# Patient Record
Sex: MALE | Race: WHITE | Employment: UNEMPLOYED | URBAN - METROPOLITAN AREA
[De-identification: names, ages, dates, MRNs, and addresses within clinical notes are randomized per-mention and may not be internally consistent; named-entity substitution may affect disease eponyms.]

---

## 2017-01-03 ENCOUNTER — DOCUMENTATION ONLY (OUTPATIENT)
Dept: ANESTHESIOLOGY | Facility: CLINIC | Age: 32
End: 2017-01-03

## 2017-01-03 DIAGNOSIS — M54.6 PAIN IN THORACIC SPINE: Primary | ICD-10-CM

## 2017-01-03 RX ORDER — FENTANYL 25 UG/1
1 PATCH TRANSDERMAL
Qty: 10 PATCH | Refills: 0 | Status: SHIPPED | OUTPATIENT
Start: 2017-01-11 | End: 2017-04-17

## 2017-01-03 NOTE — TELEPHONE ENCOUNTER
Today I left a message on Manoj Carias cell phone that we will wean him off the pain medications.  As per our discussion last office visit he found a new Pain center close to his house and the pain physician would like to do thoracic facets syndrome and thoracic MBB's  In order for them to do this patient needs to be off narcotics so as we previously discussed I'm going to wean him off his Oipiods Fentanyl patch decreased form 50 mcg/hour to 25 mcg/hour from January 11, 2017-to 12.5 mcg/hour up to February 10 th, 2017 and to off in March. As well Dilaudid 4 mg PO Q 12 hours prn pain max to 2 tabs per day for January to 4 mg PO Q 12 hours prn pain (max of 1.5 mg Tabs/day=6 mg per day) to 4 mg Po 1/2 tab Q 12 hours prn pain (2 mg PO Q 12 hours  Prn pain) in February to Off in March

## 2017-02-14 DIAGNOSIS — C81.90 HODGKIN LYMPHOMA (H): Primary | ICD-10-CM

## 2017-04-17 ENCOUNTER — OFFICE VISIT (OUTPATIENT)
Dept: TRANSPLANT | Facility: CLINIC | Age: 32
End: 2017-04-17
Attending: NURSE PRACTITIONER
Payer: MEDICARE

## 2017-04-17 VITALS
DIASTOLIC BLOOD PRESSURE: 62 MMHG | BODY MASS INDEX: 31.25 KG/M2 | OXYGEN SATURATION: 97 % | TEMPERATURE: 98.5 F | RESPIRATION RATE: 16 BRPM | WEIGHT: 250 LBS | HEART RATE: 86 BPM | SYSTOLIC BLOOD PRESSURE: 112 MMHG

## 2017-04-17 DIAGNOSIS — C81.00 NODULAR LYMPHOCYTE PREDOMINANT HODGKIN LYMPHOMA, UNSPECIFIED BODY REGION (H): Primary | ICD-10-CM

## 2017-04-17 LAB
ALBUMIN SERPL-MCNC: 3.6 G/DL (ref 3.4–5)
ALP SERPL-CCNC: 137 U/L (ref 40–150)
ALT SERPL W P-5'-P-CCNC: 33 U/L (ref 0–70)
ANION GAP SERPL CALCULATED.3IONS-SCNC: 7 MMOL/L (ref 3–14)
AST SERPL W P-5'-P-CCNC: 25 U/L (ref 0–45)
BASOPHILS # BLD AUTO: 0.1 10E9/L (ref 0–0.2)
BASOPHILS NFR BLD AUTO: 0.5 %
BILIRUB SERPL-MCNC: 0.7 MG/DL (ref 0.2–1.3)
BUN SERPL-MCNC: 14 MG/DL (ref 7–30)
CALCIUM SERPL-MCNC: 8.8 MG/DL (ref 8.5–10.1)
CHLORIDE SERPL-SCNC: 104 MMOL/L (ref 94–109)
CO2 SERPL-SCNC: 28 MMOL/L (ref 20–32)
CREAT SERPL-MCNC: 1.03 MG/DL (ref 0.66–1.25)
DIFFERENTIAL METHOD BLD: ABNORMAL
EOSINOPHIL # BLD AUTO: 0.3 10E9/L (ref 0–0.7)
EOSINOPHIL NFR BLD AUTO: 1.7 %
ERYTHROCYTE [DISTWIDTH] IN BLOOD BY AUTOMATED COUNT: 12.7 % (ref 10–15)
GFR SERPL CREATININE-BSD FRML MDRD: 83 ML/MIN/1.7M2
GLUCOSE SERPL-MCNC: 108 MG/DL (ref 70–99)
HCT VFR BLD AUTO: 48.3 % (ref 40–53)
HGB BLD-MCNC: 16.5 G/DL (ref 13.3–17.7)
IMM GRANULOCYTES # BLD: 0.1 10E9/L (ref 0–0.4)
IMM GRANULOCYTES NFR BLD: 0.5 %
LDH SERPL L TO P-CCNC: 220 U/L (ref 85–227)
LYMPHOCYTES # BLD AUTO: 3.2 10E9/L (ref 0.8–5.3)
LYMPHOCYTES NFR BLD AUTO: 21 %
MCH RBC QN AUTO: 31.4 PG (ref 26.5–33)
MCHC RBC AUTO-ENTMCNC: 34.2 G/DL (ref 31.5–36.5)
MCV RBC AUTO: 92 FL (ref 78–100)
MONOCYTES # BLD AUTO: 0.8 10E9/L (ref 0–1.3)
MONOCYTES NFR BLD AUTO: 5.4 %
NEUTROPHILS # BLD AUTO: 10.7 10E9/L (ref 1.6–8.3)
NEUTROPHILS NFR BLD AUTO: 70.9 %
NRBC # BLD AUTO: 0 10*3/UL
NRBC BLD AUTO-RTO: 0 /100
PLATELET # BLD AUTO: 137 10E9/L (ref 150–450)
POTASSIUM SERPL-SCNC: 4.2 MMOL/L (ref 3.4–5.3)
PROT SERPL-MCNC: 6.9 G/DL (ref 6.8–8.8)
RBC # BLD AUTO: 5.26 10E12/L (ref 4.4–5.9)
SODIUM SERPL-SCNC: 139 MMOL/L (ref 133–144)
WBC # BLD AUTO: 15 10E9/L (ref 4–11)

## 2017-04-17 PROCEDURE — 86357 NK CELLS TOTAL COUNT: CPT | Performed by: INTERNAL MEDICINE

## 2017-04-17 PROCEDURE — 86355 B CELLS TOTAL COUNT: CPT | Performed by: INTERNAL MEDICINE

## 2017-04-17 PROCEDURE — 88311 DECALCIFY TISSUE: CPT | Performed by: INTERNAL MEDICINE

## 2017-04-17 PROCEDURE — 38221 DX BONE MARROW BIOPSIES: CPT | Mod: 50,ZF

## 2017-04-17 PROCEDURE — 86359 T CELLS TOTAL COUNT: CPT | Performed by: INTERNAL MEDICINE

## 2017-04-17 PROCEDURE — 88305 TISSUE EXAM BY PATHOLOGIST: CPT | Performed by: INTERNAL MEDICINE

## 2017-04-17 PROCEDURE — G0364 BONE MARROW ASPIRATE &BIOPSY: HCPCS | Mod: ZF

## 2017-04-17 PROCEDURE — 88237 TISSUE CULTURE BONE MARROW: CPT | Performed by: INTERNAL MEDICINE

## 2017-04-17 PROCEDURE — 00000161 ZZHCL STATISTIC H-SPHEME PROCESS B/S: Performed by: INTERNAL MEDICINE

## 2017-04-17 PROCEDURE — 25000128 H RX IP 250 OP 636: Mod: ZF | Performed by: STUDENT IN AN ORGANIZED HEALTH CARE EDUCATION/TRAINING PROGRAM

## 2017-04-17 PROCEDURE — 88264 CHROMOSOME ANALYSIS 20-25: CPT | Performed by: INTERNAL MEDICINE

## 2017-04-17 PROCEDURE — 83615 LACTATE (LD) (LDH) ENZYME: CPT | Performed by: INTERNAL MEDICINE

## 2017-04-17 PROCEDURE — 80053 COMPREHEN METABOLIC PANEL: CPT | Performed by: INTERNAL MEDICINE

## 2017-04-17 PROCEDURE — 86360 T CELL ABSOLUTE COUNT/RATIO: CPT | Performed by: INTERNAL MEDICINE

## 2017-04-17 PROCEDURE — 85025 COMPLETE CBC W/AUTO DIFF WBC: CPT | Performed by: INTERNAL MEDICINE

## 2017-04-17 PROCEDURE — 40000611 ZZHCL STATISTIC MORPHOLOGY W/INTERP HEMEPATH TC 85060: Performed by: INTERNAL MEDICINE

## 2017-04-17 PROCEDURE — 40000424 ZZHCL STATISTIC BONE MARROW CORE PERF TC 38221: Performed by: INTERNAL MEDICINE

## 2017-04-17 PROCEDURE — 40000425 ZZHCL STATISTIC ADDL BM COR PERF TC 38221: Performed by: INTERNAL MEDICINE

## 2017-04-17 PROCEDURE — 00000058 ZZHCL STATISTIC BONE MARROW ASP PERF TC 38220: Performed by: INTERNAL MEDICINE

## 2017-04-17 PROCEDURE — 88280 CHROMOSOME KARYOTYPE STUDY: CPT | Performed by: INTERNAL MEDICINE

## 2017-04-17 PROCEDURE — 81267 CHIMERISM ANAL NO CELL SELEC: CPT | Performed by: INTERNAL MEDICINE

## 2017-04-17 PROCEDURE — 88161 CYTOPATH SMEAR OTHER SOURCE: CPT | Mod: 59 | Performed by: INTERNAL MEDICINE

## 2017-04-17 PROCEDURE — 40000951 ZZHCL STATISTIC BONE MARROW INTERP TC 85097: Performed by: INTERNAL MEDICINE

## 2017-04-17 RX ADMIN — MIDAZOLAM 2 MG: 1 INJECTION INTRAMUSCULAR; INTRAVENOUS at 15:10

## 2017-04-17 ASSESSMENT — PAIN SCALES - GENERAL: PAINLEVEL: MODERATE PAIN (5)

## 2017-04-17 NOTE — NURSING NOTE
Pt. Was discharged from [facility]. Dressing was dry and intact. Pt. Was discharged with family.

## 2017-04-17 NOTE — NURSING NOTE
Pt's port flushed with heparin and needle removed.  Bandaid applied to site.  No evidence of bleeding observed.  PT discharged to home after verbal review of discharge teaching.  Both wife and pt report understanding of when to return to BMT clinic and why/when to call with clinical concerns.

## 2017-04-17 NOTE — NURSING NOTE
BMT Heme Malignancy Rooming Note    Manoj Carias - 4/17/2017 3:25 PM     Chief Complaint   Patient presents with     Bone Marrow Biopsy     Patient here for BMBX r/t hodgkins post transplant.         /80  Pulse 97  Temp 98.5  F (36.9  C)  Resp 16  Wt 113.4 kg (250 lb)  SpO2 97%  BMI 31.25 kg/m2     Medications reviewed: Yes    Labs drawn: Yes    Drawn by: Clinic Staff  Via: portacath  See Doc Flowsheet for details.      Dressing changed: No     Medications given: Yes - See MAR    Staff time:0    Additional information if applicable: BMBX today. Patient is also complaining of a fluid like sensation in his ears. Provider aware and will examine post biopsy.     BMT Teaching Flowsheet   Teaching Topic: post biopsy instructions    Person(s) involved in teaching: Patient  Motivation Level  Asks Questions: Yes  Eager to Learn: Yes  Cooperative: Yes  Receptive (willing/able to accept information): Yes    Patient demonstrates understanding of the following:   - Reason for the appointment, diagnosis and treatment plan: Yes  - Knowledge of proper use of medications and conditions for which they are ordered (with special attention to potential side effects or drug interactions): Yes  - Which situations necessitate calling provider and whom to contact: Yes    Teaching concerns addressed: reviewed activity restrictions if received premeds, potential for bleeding and actions to take if develops any of the issues below    Proper use and care of (medical equipment, care aids, etc.) Yes  Pain management techniques: Yes  Patient instructed on hand hygiene: Yes  How and/when to access community resources: Yes    Infection Control:  Patient demonstrates understanding of the following:   Surgical procedure site care taught NA  Signs and symptoms of infection taught Yes  Wound care taught Yes  Central venous catheter care taught NA    Instructional Materials Used/Given: verbal, print out of post biopsy instructions.      Time spent with patient: 5 minutes.    Specific Concerns: JOSE Sullivan RN

## 2017-04-17 NOTE — MR AVS SNAPSHOT
After Visit Summary   4/17/2017    Manoj Carias    MRN: [id number]           Patient Information     Date Of Birth          1985        Visit Information        Provider Department      4/17/2017 3:00 PM UU BONE MARROW BIOPSY;  BMT JADE #4 [facility] Blood and Marrow Transplant        Today's Diagnoses     Nodular lymphocyte predominant Hodgkin lymphoma, unspecified body region (H)    -  1          [facility] [place]  [address] [place] [id number]  Phone: [phone]  Clinic Hours:   Monday-Friday:    8am to 4:30pm   Weekends and holidays:    8am to noon (in general)  If your fever is 100.5  or greater,   call the clinic.  After hours call the   hospital at [phone] or   [phone]. Ask for the BMT   fellow for pediatric or adult patients            Follow-ups after your visit        Your next 10 appointments already scheduled     Apr 20, 2017 10:30 AM CDT   BMT Anniversary Visit with Tho Ronquillo MD   [facility] Blood and Marrow Transplant ([facility])    [address] [place] [id number] [phone]              Who to contact     If you have questions or need follow up information about today's clinic visit or your schedule please contact [facility] BLOOD [facility] MARROW TRANSPLANT directly at [phone].  Normal or non-critical lab and imaging results will be communicated to you by MyChart, letter or phone within 4 business days after the clinic has received the results. If you do not hear from us within 7 days, please contact the clinic through MyChart or phone. If you have a critical or abnormal lab result, we will notify you by phone as soon as possible.  Submit refill requests through [employer] or call your pharmacy and they will forward the refill request to us. Please allow 3 business days for your refill to be completed.          Additional Information About Your Visit        [employer]  "Information     [employer] lets you send messages to your doctor, view your test results, renew your prescriptions, schedule appointments and more. To sign up, go to [email or web address] . Click on \"Log in\" on the left side of the screen, which will take you to the Welcome page. Then click on \"Sign up Now\" on the right side of the page.     You will be asked to enter the access code listed below, as well as some personal information. Please follow the directions to create your username and password.     Your access code is: [email or web address]  Expires: 2017  6:30 AM     Your access code will  in 90 days. If you need help or a new code, please call your [place] clinic or [phone].        Care EveryWhere ID     This is your [facility] EveryWhere ID. This could be used by other organizations to access your [place] medical records  [id number]        Your Vitals Were     Pulse Temperature Respirations Pulse Oximetry BMI (Body Mass Index)       86 98.5  F (36.9  C) 16 97% 31.25 kg/m2        Blood Pressure from Last 3 Encounters:   17 112/62   16 97/69   16 127/80    Weight from Last 3 Encounters:   17 113.4 kg (250 lb)   16 103.4 kg (227 lb 14.4 oz)   16 98.9 kg (218 lb)              We Performed the Following     Bone Marrow Aspirate (Charge)     Bone marrow biopsy tests/details     Bone marrow biopsy     Bone Marrow Core Biopsy Bilateral (Charge)     CBC with platelets differential     Chromosome bone marrow     Comprehensive metabolic panel     DNA marker post bmt engraft bone marrow     Lactate Dehydrogenase     Leukemia Lymphoma Evaluation (Flow Cytometry)     T Cell Subset Extended Profile          Today's Medication Changes          These changes are accurate as of: 17 11:59 PM.  If you have any questions, ask your nurse or doctor.               Stop taking these medicines if you haven't already. Please contact your care team if you have questions.     acetaminophen " 325 MG tablet   Commonly known as:  TYLENOL           CHANTIX PO           citalopram 10 MG tablet   Commonly known as:  celeXA           fentaNYL 25 mcg/hr 72 hr patch   Commonly known as:  DURAGESIC           HYDROmorphone 4 MG tablet   Commonly known as:  DILAUDID           LIDODERM 5 % Patch   Generic drug:  lidocaine           order for DME           polyethylene glycol Packet   Commonly known as:  MIRALAX/GLYCOLAX                    Recent Review Flowsheet Data     BMT Recent Results Latest Ref Rng & Units 12/16/2015 1/13/2016 3/8/2016 4/11/2016 4/11/2016 7/28/2016 4/17/2017    WBC 4.0 - 11.0 10e9/L 20.2(H) 7.4 18.1(H) - 14.1(H) 10.5 15.0(H)    Hemoglobin 13.3 - 17.7 g/dL 15.0 14.2 15.0 - 14.1 15.7 16.5    Platelet Count 150 - 450 10e9/L 120(L) 83(L) 33(LL) - 76(L) 111(L) 137(L)    Neutrophils (Absolute) 1.6 - 8.3 10e9/L 14.2(H) 5.5 14.5(H) - 9.8(H) 6.9 10.7(H)    INR 0.86 - 1.14 - - - - - - -    Sodium 133 - 144 mmol/L 138 136 140 - 137 139 139    Potassium 3.4 - 5.3 mmol/L 4.3 4.2 4.2 - 4.3 3.8 4.2    Chloride 94 - 109 mmol/L 107 103 105 - 103 107 104    Glucose 70 - 99 mg/dL 70 81 104(H) - 80 80 108(H)    Urea Nitrogen 7 - 30 mg/dL 14 12 10 - 10 15 14    Creatinine 0.66 - 1.25 mg/dL 0.81 0.89 0.80 0.8 0.86 0.78 1.03    Calcium (Total) 8.5 - 10.1 mg/dL 8.4(L) 8.4(L) 9.3 - 8.8 8.9 8.8    Protein (Total) 6.8 - 8.8 g/dL 7.3 6.6(L) 7.3 - 6.6(L) 6.7(L) 6.9    Albumin 3.4 - 5.0 g/dL 3.7 3.3(L) 3.8 - 3.5 3.7 3.6    Bilirubin (Direct) 0.0 - 0.2 mg/dL - - - - - - -    Alkaline Phosphatase 40 - 150 U/L 185(H) 165(H) 164(H) - 127 122 137    AST 0 - 45 U/L 27 50(H) 17 - 13 17 25    ALT 0 - 70 U/L 44 39 26 - 21 22 33    MCV 78 - 100 fl 94 93 94 - 94 90 92               Primary Care Provider Office Phone # Fax #    Olayinka Mondragon [phone]       [facility] [address] [facility] [address]        Thank you!     Thank you for choosing [facility]LOOD [facility]ARROW [facility]ANSPL[facility]  for your care. Our goal  is always to provide you with excellent care. Hearing back from our patients is one way we can continue to improve our services. Please take a few minutes to complete the written survey that you may receive in the mail after your visit with us. Thank you!             Your Updated Medication List - Protect others around you: Learn how to safely use, store and throw away your medicines at www.disposemymeds.org.          This list is accurate as of: 4/17/17 11:59 PM.  Always use your most recent med list.                   Brand Name Dispense Instructions for use    NEXIUM PO      Take 20 mg by mouth every morning (before breakfast)

## 2017-04-18 LAB
CD19 CELLS # BLD: 990 CELLS/UL (ref 107–698)
CD19 CELLS NFR BLD: 32 % (ref 6–27)
CD3 CELLS # BLD: 1889 CELLS/UL (ref 603–2990)
CD3 CELLS NFR BLD: 61 % (ref 49–84)
CD3+CD4+ CELLS # BLD: 1381 CELLS/UL (ref 441–2156)
CD3+CD4+ CELLS NFR BLD: 44 % (ref 28–63)
CD3+CD4+ CELLS/CD3+CD8+ CLL BLD: 2.75 % (ref 1.4–2.6)
CD3+CD8+ CELLS # BLD: 489 CELLS/UL (ref 125–1312)
CD3+CD8+ CELLS NFR BLD: 16 % (ref 10–40)
CD3-CD16+CD56+ CELLS # BLD: 157 CELLS/UL (ref 95–640)
CD3-CD16+CD56+ CELLS NFR BLD: 5 % (ref 4–25)
COPATH REPORT: NORMAL
COPATH REPORT: NORMAL
IFC SPECIMEN: ABNORMAL
IMMUNODEFICIENCY MARKERS SPEC-IMP: ABNORMAL

## 2017-04-18 NOTE — PROGRESS NOTES
BMT ONC Adult Bone Marrow Biopsy Procedure Note  April 18, 2017  /62  Pulse 86  Temp 98.5  F (36.9  C)  Resp 16  Wt 113.4 kg (250 lb)  SpO2 97%  BMI 31.25 kg/m2     Learning needs assessment complete within 12 months? YES    DIAGNOSIS: Lymphoma      PROCEDURE: Bilateral Bone Marrow Biopsy and Unilateral Aspirate    [place] [facility]    Patient s identification was positively verified by verbal identification and invasive procedure safety checklist was completed. Informed consent was obtained. Following the administration of Midazolam as pre-medication, patient was placed in the prone position and prepped and draped in a sterile manner. Approximately 20 cc of 1% Lidocaine was used over the bilateral posterior iliac spine. Following this a 3 mm incision was made. Trephine bone marrow core(s) was (were) obtained from the [facility]. Bone marrow aspirates were obtained from the [facility]. Aspirates were sent for morphology, immunophenotyping, cytogenetics and molecular diagnostics RFLP. A total of approximately 20 ml of marrow was aspirated. Following this procedure a sterile dressing was applied to the bone marrow biopsy site(s). The patient was placed in the supine position to maintain pressure on the biopsy site. Post-procedure wound care instructions were given.     Complications: NO    Pre-procedural pain: 0 out of 10 on the numeric pain rating scale.     Procedural pain: 5 out of 10 on the numeric pain rating scale.     Post-procedural pain assessment: 0 out of 10 on the numeric pain rating scale.     Interventions: NO    Length of procedure:20 minutes or less      Procedure performed by: Brit BRAY

## 2017-04-20 ENCOUNTER — OFFICE VISIT (OUTPATIENT)
Dept: TRANSPLANT | Facility: CLINIC | Age: 32
End: 2017-04-20
Attending: INTERNAL MEDICINE
Payer: MEDICARE

## 2017-04-20 VITALS
RESPIRATION RATE: 16 BRPM | WEIGHT: 254.2 LBS | SYSTOLIC BLOOD PRESSURE: 122 MMHG | HEART RATE: 101 BPM | BODY MASS INDEX: 31.77 KG/M2 | TEMPERATURE: 98.7 F | OXYGEN SATURATION: 100 % | DIASTOLIC BLOOD PRESSURE: 79 MMHG

## 2017-04-20 DIAGNOSIS — Z94.84 H/O AUTOLOGOUS STEM CELL TRANSPLANT (H): Primary | ICD-10-CM

## 2017-04-20 LAB — COPATH REPORT: NORMAL

## 2017-04-20 PROCEDURE — 90723 DTAP-HEP B-IPV VACCINE IM: CPT | Mod: ZF | Performed by: INTERNAL MEDICINE

## 2017-04-20 PROCEDURE — 90472 IMMUNIZATION ADMIN EACH ADD: CPT

## 2017-04-20 PROCEDURE — 90670 PCV13 VACCINE IM: CPT | Mod: ZF | Performed by: INTERNAL MEDICINE

## 2017-04-20 PROCEDURE — 99212 OFFICE O/P EST SF 10 MIN: CPT | Mod: ZF

## 2017-04-20 PROCEDURE — 90648 HIB PRP-T VACCINE 4 DOSE IM: CPT | Mod: ZF | Performed by: INTERNAL MEDICINE

## 2017-04-20 PROCEDURE — 25000128 H RX IP 250 OP 636: Mod: ZF | Performed by: INTERNAL MEDICINE

## 2017-04-20 PROCEDURE — 90707 MMR VACCINE SC: CPT | Mod: ZF | Performed by: INTERNAL MEDICINE

## 2017-04-20 PROCEDURE — G0009 ADMIN PNEUMOCOCCAL VACCINE: HCPCS

## 2017-04-20 PROCEDURE — 90716 VAR VACCINE LIVE SUBQ: CPT | Mod: ZF | Performed by: INTERNAL MEDICINE

## 2017-04-20 PROCEDURE — 99212 OFFICE O/P EST SF 10 MIN: CPT | Mod: 25

## 2017-04-20 PROCEDURE — 25000134 H RX MED IP 250 OP 636 PS 250: Mod: ZF | Performed by: INTERNAL MEDICINE

## 2017-04-20 PROCEDURE — 25000125 ZZHC RX 250: Mod: ZF | Performed by: INTERNAL MEDICINE

## 2017-04-20 RX ADMIN — DIPHTHERIA AND TETANUS TOXOIDS AND ACELLULAR PERTUSSIS ADSORBED, HEPATITIS B (RECOMBINANT) AND INACTIVATED POLIOVIRUS VACCINE COMBINED 0.5 ML: 25; 10; 25; 25; 8; 10; 40; 8; 32 INJECTION, SUSPENSION INTRAMUSCULAR at 12:07

## 2017-04-20 RX ADMIN — PNEUMOCOCCAL 13-VALENT CONJUGATE VACCINE 0.5 ML: 2.2; 2.2; 2.2; 2.2; 2.2; 4.4; 2.2; 2.2; 2.2; 2.2; 2.2; 2.2; 2.2 INJECTION, SUSPENSION INTRAMUSCULAR at 12:09

## 2017-04-20 RX ADMIN — VARICELLA VIRUS VACCINE LIVE 0.5 ML: 1350 INJECTION, POWDER, LYOPHILIZED, FOR SUSPENSION SUBCUTANEOUS at 12:08

## 2017-04-20 RX ADMIN — MEASLES, MUMPS, AND RUBELLA VIRUS VACCINE LIVE 0.5 ML: 1000; 12500; 1000 INJECTION, POWDER, LYOPHILIZED, FOR SUSPENSION SUBCUTANEOUS at 12:09

## 2017-04-20 RX ADMIN — HAEMOPHILUS B POLYSACCHARIDE CONJUGATE VACCINE FOR INJ 0.5 ML: RECON SOLN at 12:07

## 2017-04-20 ASSESSMENT — PAIN SCALES - GENERAL: PAINLEVEL: NO PAIN (0)

## 2017-04-20 NOTE — PROGRESS NOTES
Capri Paez MD     [facility]    [address], [address]     [place]  [address]       RE: Manoj Jv      Dear Dr. Paez:      This is a note concerning your patient, Manoj Carias.  As you know, Mr. 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  He returns for protocol-driven two year follow-up visit.  He complains of occasional lower back pain.  He is not yet working.       On examination today, this is an alert and cooperative white male in no acute distress.  Examination of the head, eyes, ears, nose and throat is unremarkable.  Breath sounds are normal.  There is a regular rate and rhythm of the heart with no extra sounds.  There is no abdominal or CVA tenderness or masses.  There is no pretibial edema, and there is no pain to palpation of the calves or the thighs bilaterally.  Vibratory sense and proprioception are intact.  Strength is 4/5 throughout.  There is no significant rash.       Laboratory values obtained today include CBC 15/16.5/137K.  Electrolytes, creatinine and and LFTs are unremarkable.  Diagnostic marrow is 40% cellular with trilineage hematopoiesis and no evidence of lymphoma or other malignancy.      Repeat CT/PET of the NCAP reveals no evidence of recurrent lymphoma.        In summary, this patient is now two years status post allogeneic transplant for refractory Hodgkin lymphoma.         # BMT/lymphoma:   - There is currently no evidence of persistent or recurrent lymphoma.   - Pt will have 2 year immunizations today.     # Chronic pain:  This patient has chronic pain in the lower back, which he would attribute to vertebral bony damage from Hodgkin lymphoma.  The patient has been dependent on narcotics, including fentanyl patches and Dilaudid p.o.  However, he states that he has not used narcotic anaelgesia since January, 2017.      # GI:      The  patient has had recurrent epigastric discomfort.  He is followed by a GI consultant in [place].  He recently underwent UGI endoscopy.  Results of the study are not known to me.    Tho Ronquillo MD.     Thirty minute visit.  Greater than 50% care coordination and counseling.

## 2017-04-20 NOTE — NURSING NOTE
BMT Heme Malignancy Rooming Note    Manoj Carias - 4/20/2017 10:47 AM     Chief Complaint   Patient presents with     RECHECK     HODGKINS- 2 year review        /79  Pulse 101  Temp 98.7  F (37.1  C) (Oral)  Resp 16  Wt 115.3 kg (254 lb 3.2 oz)  SpO2 100%  BMI 31.77 kg/m2     Medications reviewed: Yes    Labs drawn: No    Dressing changed: No     Medications given: No    Staff time:5    Additional information if applicable: n/a    GOOD DOHERTY CMA

## 2017-05-08 LAB — COPATH REPORT: NORMAL

## 2018-01-23 ENCOUNTER — DOCUMENTATION ONLY (OUTPATIENT)
Dept: FAMILY MEDICINE | Facility: OTHER | Age: 33
End: 2018-01-23

## 2018-01-23 RX ORDER — FENTANYL 50 UG/1
1 PATCH TRANSDERMAL
COMMUNITY
End: 2021-03-24

## 2018-01-23 RX ORDER — HYDROMORPHONE HYDROCHLORIDE 4 MG/1
4 TABLET ORAL EVERY 4 HOURS PRN
COMMUNITY
End: 2021-03-24

## 2020-01-31 ENCOUNTER — NURSE TRIAGE (OUTPATIENT)
Dept: NURSING | Facility: CLINIC | Age: 35
End: 2020-01-31

## 2020-01-31 NOTE — TELEPHONE ENCOUNTER
Seen at non-[place] oncology clinic in [facility] .   Reason for call;  Wife called without Pt . Pt is cancer free for last 5 years , and had 2 bone marrow transplants about 5 years ago  .  Has chemo brain  And can't work , and kick off social security  Disability recently  .   Recommendation / teaching ; Discuss with wife , she is filling out paperwork to get social security disablity reinstated . FNA advised to consult with her  1st and make appt with last oncologist for information and help with resubmitting paperwork. Wife agrees.      Caller Verbalizes understanding and denies further questions and will call back if further symptoms to triage or questions  .  Katie Edwards RN  - [place] Nurse Advisor

## 2020-07-24 LAB
ALT SERPL-CCNC: 28 U/L (ref 8–45)
AST SERPL-CCNC: 19 U/L (ref 5–41)
CREAT SERPL-MCNC: 1.05 MG/DL (ref 0.72–1.25)
GFR SERPL CREATININE-BSD FRML MDRD: >60 ML/MIN/1.73M2
GLUCOSE SERPL-MCNC: 91 MG/DL (ref 70–100)
POTASSIUM SERPL-SCNC: 4.2 MMOL/L (ref 3.5–5.1)

## 2021-02-03 ENCOUNTER — TRANSFERRED RECORDS (OUTPATIENT)
Dept: HEALTH INFORMATION MANAGEMENT | Facility: CLINIC | Age: 36
End: 2021-02-03

## 2021-02-04 ENCOUNTER — TRANSCRIBE ORDERS (OUTPATIENT)
Dept: OTHER | Age: 36
End: 2021-02-04

## 2021-02-04 DIAGNOSIS — Z85.72 HISTORY OF NON-HODGKIN'S LYMPHOMA: ICD-10-CM

## 2021-02-04 DIAGNOSIS — G62.0 NEUROPATHY DUE TO DRUGS (H): Primary | ICD-10-CM

## 2021-03-24 ENCOUNTER — OFFICE VISIT (OUTPATIENT)
Dept: NEUROLOGY | Facility: CLINIC | Age: 36
End: 2021-03-24
Attending: FAMILY MEDICINE
Payer: COMMERCIAL

## 2021-03-24 VITALS
OXYGEN SATURATION: 98 % | HEIGHT: 75 IN | WEIGHT: 273.4 LBS | DIASTOLIC BLOOD PRESSURE: 95 MMHG | BODY MASS INDEX: 33.99 KG/M2 | SYSTOLIC BLOOD PRESSURE: 143 MMHG | HEART RATE: 89 BPM

## 2021-03-24 DIAGNOSIS — M79.604 PAIN OF RIGHT LOWER EXTREMITY: ICD-10-CM

## 2021-03-24 DIAGNOSIS — G56.03 BILATERAL CARPAL TUNNEL SYNDROME: Primary | ICD-10-CM

## 2021-03-24 DIAGNOSIS — G62.9 SENSORY NEUROPATHY: ICD-10-CM

## 2021-03-24 PROCEDURE — 99203 OFFICE O/P NEW LOW 30 MIN: CPT | Performed by: PSYCHIATRY & NEUROLOGY

## 2021-03-24 RX ORDER — DULOXETIN HYDROCHLORIDE 30 MG/1
60 CAPSULE, DELAYED RELEASE ORAL DAILY
COMMUNITY
Start: 2021-02-03 | End: 2022-02-03

## 2021-03-24 RX ORDER — IBUPROFEN 200 MG
800 TABLET ORAL PRN
COMMUNITY

## 2021-03-24 ASSESSMENT — MIFFLIN-ST. JEOR: SCORE: 2250.76

## 2021-03-24 ASSESSMENT — PATIENT HEALTH QUESTIONNAIRE - PHQ9: SUM OF ALL RESPONSES TO PHQ QUESTIONS 1-9: 21

## 2021-03-24 ASSESSMENT — PAIN SCALES - GENERAL: PAINLEVEL: EXTREME PAIN (8)

## 2021-03-24 NOTE — NURSING NOTE
Depression Response    Patient completed the PHQ-9 assessment for depression and scored >9? Yes  Question 9 on the PHQ-9 was positive for suicidality? No  Is the patient already receiving treatment for depression? Yes    Is this a virtual visit?  No    I personally notified the following: visit provider    Behavioral Health Contact Information    Eder Olivo PsyD [facility]  Pager: [phone]

## 2021-03-24 NOTE — PATIENT INSTRUCTIONS
"[facility]:  PHQ-9 Screening Note    SITUATION/BACKGROUND                                                    Manoj Carias is a 36 year old male who completed the PHQ-9 assessment for depression and Score is >9.    Onset of symptoms: Ongoing for some time and being addressed by his PCP        ASSESSMENT      A. Are any of the following present?      Suicidal thoughts with a plan and means to carry out the plan?    Intent to harm others    Altered mental status: confusion, delusional, psychotic YES  - Patient should be seen in the ED.  If patient is willing to go to the ED, call [facility] Non Emergent Transportation at [phone].  If patient is unwilling to go to the ED, call 911.   Clinic staff to fill out the  Transportation Hold  form.    Place order for referral to behavioral health team for  regular  follow-up.    NO - go to B   B. Are any of the following present?      Suicidal thoughts without a plan or means to carry out the plan    New onset of delusional ideas    Past inpatient admission for depression    New onset and recent change or addition of new medication YES  - Patient should receive crises care within 2-4 hours. Offer emergency room care or connect with any of the *crisis resources.     Place referral to behavioral health team for \"regular\" follow-up.    NO - go to C   C. Are any of the following present?      Previous suicide attempts    Depression interfering with ability to work or function    Loss of appetite and eating poorly    Abrupt cessation of drugs (OTC or RX), alcohol or caffeine    Drug or alcohol abuse YES -  [facility] behavior health team. If no response, patient should receive crisis care within 24 hours.     Place referral to behavioral health team for \"regular\" follow-up.     NO - go to D   D. Are several of the following present?      Difficulty concentrating    Difficulty sleeping    Reduced interest in sexual activity or impotency    Irregular or absent " "menstruation    No interest in activity    Change in interpersonal relationships    Increased use/abuse of alcohol or drugs    Pregnant or recent child birth    Recent major life change    History of depression YES -  Follow-up with PCP for appointment and follow home care instructions.    Place referral to behavioral health team for \"regular\" follow-up.    NO - provide home care instructions.        PLAN      Home Care Instructions:   If currently in counseling, call counselor for appointment  Contact friends or family for support  Increase exercise and enjoyable activities  East a well-balanced diet, drink plenty of fluids and rest as needed  Alcohol and other recreational drugs can worsen depression.  If heavy use of drugs or alcohol, contact counselor or PCP to help reduce consumption.    Report the following to your PCP:   Suicidal thoughts without a plan or means to carry out the plan  Depression interferes with daily activities  Persistent inability to sleep    Seek emergency care immediately if any of the following occur:   Suicidal thoughts and plan and means to carry out the plan  Injury to self or others    BEHAVIORAL HEALTH TEAMS      [facility] - Behavioral Health Team    [facility] Pager: [phone]    [place]  - Behavioral Health Team    Pager number: [phone]    Referral to Behavioral Health    BEHAVIORAL / SPIRITUAL HEALTH SOWQ [19504}    RESOURCES      - 24/7 Crisis Hotlines: National Suicide Prevention Hotline  485-475-IUEL (8747)  - Urgent Care for Adult Mental Health:  [phone]  (24 hours a day)  [address], [place] [address]  DROP IN:  Monday - Friday: 8:00 am - 7:00 pm  Saturday: 11:00 am - 3:00 pm   Sunday and Holidays: Closed    Tosha Mari RN        Copyright 2016 [employer]      "

## 2021-03-24 NOTE — NURSING NOTE
"Manoj Carias's goals for this visit include: consult  He requests these members of his care team be copied on today's visit information:     PCP: Olayinka Mondragon    Referring Provider:  Olayinka Mondragon  [facility]  [address] [place] [address]    BP (!) 143/95   Pulse 89   Ht 1.905 m (6' 3\")   Wt 124 kg (273 lb 6.4 oz)   SpO2 98%   BMI 34.17 kg/m      Do you need any medication refills at today's visit? n  "

## 2021-03-24 NOTE — PROGRESS NOTES
Visit Date:   03/24/2021      Dear Dr. Mondragon:      Thank you for referring Manoj Carias.  He is a 36-year-old right-handed male here for evaluation of bilateral hand pain and paresthesias and right lower extremity pain and paresthesias.      The patient was diagnosed with Hodgkin's lymphoma in 03/2013.  His initial treatment was with chemotherapy using ABVD which contains vinblastine.  He subsequently underwent an autologous stem cell transplant at the [facility].  He relapsed.  He was started on ifosfamide, carboplatin, and etoposide and also received 2 cycles of cisplatin and gemcitabine.  He was following placed on Brentuximab but developed anaphylaxis with this drug.  He subsequently underwent an allogenic stem cell transplant and has been in remission since then, which was in 2015.      He tells me he began experiencing neuropathy symptoms after his first course of chemotherapy.  He continues to have problems with his hands and fingers going numb and tingling and dropping things from his hands.  He did see a neurologist in [place], he tells me, and was diagnosed with bilateral carpal tunnel syndrome, but after his disease relapse he never followed up on this.      In addition, since around 2015, he has had pain, numbness and tingling involving the right lower extremity.  He reports pain shooting from his back down the back of his leg.      He has had an MRI scanning of the lumbar spine.  I have images from 02/06/2020.  I did review these and went over them with the patient.  He has a based paracentral disk protrusion at L4 to L5 that abuts the descending right L5 nerve root.  There is also moderate right foraminal stenosis at L4 to L5 and L5 to S1, but no clear compromise of the S1 nerve root.      He does tell me he has seen a surgeon about his back and surgery was not recommended.      PAST MEDICAL HISTORY:  Notable for depression.  He has had surgery on his left knee and will be having  surgery on his right knee as well.      CURRENT MEDICATIONS:   1.  Duloxetine 60 mg.   2.  Ibuprofen.   3.  Vitamin D.      ALLERGIES:  HE IS ALLERGIC TO BRENTUXIMAB (ANAPHYLAXIS).      SOCIAL HISTORY:  He is currently not working.  He was at one point working as a .  He does smoke.  He does not use alcohol.      PHYSICAL EXAMINATION:   VITAL SIGNS:  His heart rate is 89.  Blood pressure 143/95.      He does have a positive Tinel sign when I percuss the right median nerve at the wrist, but not the left.  Straight leg raising is negative.      Cranial nerves II-XII are intact.  Motor examination reveals normal strength.  Sensory examination is notable for a slight depression of position sense in the toes.  He has absent vibratory sense in the toes.  He has patchy pinprick changes involving the hands.  He also has decreased pinprick appreciation to above the ankles.  Reflexes are 1-2+ except for the ankle jerks which are absent.  Plantar responses are flexor.      IMPRESSION:   1.  Bilateral hand pain and paresthesias which I suspect at least in part is related to carpal tunnel syndrome.   2.  Right lower extremity pain and paresthesias that suggest radiculopathy without any localizing findings on examination.   3.  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      PLAN:  I did discuss the above with the patient.      For further evaluation, he is going to have bilateral upper extremity electrodiagnostic testing.  I will be most interested in seeing if he does indeed have carpal tunnel syndrome and its severity.  He is also going to have a study of the right lower extremity, looking for evidence of radiculopathy.      I will be communicating these results to Manoj when available.      Total visit time was 35 minutes.  This included reviewing with medical records prior to the visit, history, examination, review of MRI scan, counseling, and documentation.     Reviewed EMG results  with patient. He does have changes of bilateral CTS. He has attenuated peroneal motor responses but no changes of Lumbar radiculopathy. He is interested in seeing [facility] about CTS. Referral placed.         TALISHA KING MD             D: 2021   T: 2021   MT: [place]      Name:     BERNARD GONSALEZ   MRN:      [id number]        Account:      [id number]   :      1985           Visit Date:   2021      Document: [id number]       cc: Olayinka Mondragon MD

## 2021-04-19 ENCOUNTER — OFFICE VISIT (OUTPATIENT)
Dept: NEUROLOGY | Facility: CLINIC | Age: 36
End: 2021-04-19
Payer: COMMERCIAL

## 2021-04-19 DIAGNOSIS — G56.03 BILATERAL CARPAL TUNNEL SYNDROME: Primary | ICD-10-CM

## 2021-04-19 DIAGNOSIS — G62.9 SENSORY NEUROPATHY: ICD-10-CM

## 2021-04-19 DIAGNOSIS — M79.604 PAIN OF RIGHT LOWER EXTREMITY: ICD-10-CM

## 2021-04-19 PROCEDURE — 95913 NRV CNDJ TEST 13/> STUDIES: CPT | Performed by: PSYCHIATRY & NEUROLOGY

## 2021-04-19 PROCEDURE — 95886 MUSC TEST DONE W/N TEST COMP: CPT | Performed by: PSYCHIATRY & NEUROLOGY

## 2021-04-19 NOTE — LETTER
4/19/2021         RE: Manoj Carias  [address] [facility] [id number]        Dear Colleague,    Thank you for referring your patient, Manoj Craias, to the [facility] [place]. Please see a copy of my visit note below.    [facility]  Electrodiagnostic Laboratory    Nerve Conduction & EMG Report          Patient: Manoj Carias YOB: 1985  Patient ID: [id number] Age: 36 Years 2 Months  Gender: Male      History and Examination:  Manoj Carias is a 36 year old man with intermittent numbness in both hands precipitated by activity and a right lower limb radicular syndrome. Past medical history is also notable for Hodgkin's lymphoma, treated with chemotherapy and, ultimately, bone marrow transplantation. Examination by the referring neurologist is notable for normal strength and sensory findings in the hands and distal lower limbs. 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    Techniques:  Motor conduction studies were done with surface recording electrodes. Sensory conduction studies were performed with surface electrodes, unless indicated otherwise by (n), designating the use of subdermal recording electrodes. Temperature was monitored and recorded throughout the study. Upper extremities were maintained at a temperature of 32 degrees Centigrade or higher.  Lower extremities were maintained at a temperature of 31 degrees Centigrade or higher.  EMG was performed with a concentric needle electrode.    Results:  Right sural and superficial peroneal sensory conduction studies were normal. Left radial, left median, and bilateral ulnar sensory conduction studies were normal. A right median sensory conduction study demonstrated mild slowing of conduction. Median palmar mixed nerve conduction studies demonstrated mild to moderate conduction slowing bilaterally.  Peroneal motor conduction studies demonstrated moderate and severe attenuation of amplitude on the right and left, respectively, minimal prolongation of right peroneal distal latency, and normal conduction across the fibular head bilaterally. A right tibial motor conduction study was normal. Bilateral ulnar motor conduction studies were normal. Median motor conduction studies demonstrated mild relative prolongation of distal latencies to the abductor pollicis brevis and second lumbrical muscles bilaterally. Electromyogaphy of the right lower limb was normal.     Interpretation:  This is an abnormal study, demonstrating electrophysiologic evidence of the followin. 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  2. Attenuation of peroneal compound muscle action potentials in the feet, possibly reflecting a mild motor-predominant neuropathy or incidental injury at the ankle.   3. No evidence of axonal injury to right lumbosacral nerve roots.      Carlito Ordonez MD      Sensory NCS      Nerve / Sites Rec. Site Onset Peak Ref. NP Amp Ref. PP Amp Dist Jose Ref. Temp     ms ms ms  V  V  V cm m/s m/s  C   L MEDIAN - Dig II Anti      Wrist Dig II 2.81 3.59  15.5 10.0 22.8 14 49.8 48.0 34.3   R MEDIAN - Dig II Anti      Wrist Dig II 3.13 4.01  7.2 10.0 6.3 14 44.8 48.0 33.4   L ULNAR - Dig V Anti      Wrist Dig V 2.50 3.18  21.8 8.0 31.3 12.5 50.0 48.0 34.5   R ULNAR - Dig V Anti      Wrist Dig V 2.55 3.23  16.4 8.0 24.1 12.5 49.0 48.0 33.4   L RADIAL - Snuff      Forearm Snuff 1.98 2.60  15.5 15.0 31.2 10.5 53.1 48.0 34.1   R SURAL - Lat Mall      Calf Ankle 3.28 4.06  13.8 8.0 15.5 14 42.7 38.0 31.7   R SUP PERONEAL      Lat Leg Flores 2.66 3.54  5.5  4.8 12.5 47.1 38.0 32.1   L MEDIAN - Ulnar - Palmar      Median Wrist 1.98 2.40 2.40 7.2  20.3 8 40.4  34.9      Ulnar Wrist 1.61 2.03 2.40 4.7  4.4 8 49.5  34.9   R MEDIAN - Ulnar - Palmar      Median Wrist 2.03 2.55 2.40 14.7  14.5 8  39.4  33.4      Ulnar Wrist 1.51 1.98 2.40 15.1  18.5 8 53.0  33.4       Motor NCS      Nerve / Sites Rec. Site Lat Ref. Amp Ref. Rel Amp Dist Jose Ref. Dur. Area Temp.     ms ms mV mV % cm m/s m/s ms %  C   L MEDIAN - APB      Wrist APB 4.27 4.40 11.2 5.0 100 8   8.18 100 33.8      Elbow APB 8.80  9.3  82.8 24 53.0 48.0 8.33 86.2 33.6   R MEDIAN - APB      Wrist APB 4.32 4.40 11.9 5.0 100 8   8.18 100 33.5      Elbow APB 9.11  11.9  99.8 26 54.3 48.0 8.12 103 33.5   L ULNAR - ADM      Wrist ADM 2.66 3.50 15.8 5.0 100 8   6.20 100 32.1      B.Elbow ADM 6.72  15.9  100 26 64.0 48.0 6.30 100 32.6      A.Elbow ADM 8.13  15.8  99.8 9 64.0 48.0 6.35 98.6 32.9   R ULNAR - ADM      Wrist ADM 2.50 3.50 14.3 5.0 100 8   6.61 100 33.8      B.Elbow ADM 6.77  13.9  97.3 24 56.2 48.0 6.88 96.7 33.8      A.Elbow ADM 8.65  13.5  94.2 10 53.3 48.0 6.67 94 33.6   R DEEP PERONEAL - EDB 60      Ankle EDB 6.46 6.00 1.1 2.0 100 8   7.97 100 32      FibHead EDB 15.83  0.9  79.9 40 42.7 38.0 11.61 89.2 32      Pop Fos EDB 17.34  0.9  81.8 8 53.0 38.0 12.14  32   L DEEP PERONEAL - EDB 60      Ankle EDB 5.57 6.00 0.2 2.0 100 8   10.68 100 31   R TIBIAL - AH      Ankle AH 4.58 6.00 4.5 4.0 100 8   9.01 100 31.7      Pop Fos AH 14.11  4.1  90.9 40 42.0 38.0 10.57 83.3 31.5   L MEDIAN - II Lumb      Median II Lumb 3.28  2.7  100 10   5.57 100 33.9      Ulnar Palm Int 2.76  4.2  154 10   6.04 131 33.8   R MEDIAN - II Lumb      Median II Lumb 4.22  1.5  100 10   5.78 100 33.6      Ulnar Palm Int 3.18  1.4  95.4 10   6.09 109 33.6   R PERONEAL - Tib Ant      Fib Head Tib Ant 3.65  6.8  100 12   11.30 100 31.9      Knee Tib Ant 4.95  6.6  97.2 8 61.4  11.41 96.4 31.9   L PERONEAL - Tib Ant      Fib Head Tib Ant 3.28  5.2  100 12   9.11 100 31      Knee Tib Ant 4.74  5.3  101 8 54.9  8.80 100 31       F  Wave      Nerve Min F Lat Max F Lat Mean FLat Temp.    ms ms ms  C   R TIBIAL 52.03 55.47 54.06 31.2       EMG Summary Table     Spontaneous MUAP  Recruitment    IA Fib/PSW Fasc H.F. Amp Dur. PPP Pattern   R. TIB ANTERIOR N None None None N N None Normal   R. GASTROCN (MED) N None None None N N None Normal   R. PERON TERTIUS N None None None N N None Normal   R. VAST LATERALIS N None None None N N None Normal   R. BIC FEM (S HEAD) N None None None N N None Normal   R. GLUTEUS MED N None None None N N None Normal   R. L5 PSP N None None None N N None Normal                                                                Again, thank you for allowing me to participate in the care of your patient.        Sincerely,        Carlito Ordonez MD

## 2021-04-19 NOTE — PROGRESS NOTES
[facility]  Electrodiagnostic Laboratory    Nerve Conduction & EMG Report          Patient: Manoj Carias YOB: 1985  Patient ID: [id number] Age: 36 Years 2 Months  Gender: Male      History and Examination:  Manoj Carias is a 36 year old man with intermittent numbness in both hands precipitated by activity and a right lower limb radicular syndrome. Past medical history is also notable for Hodgkin's lymphoma, treated with chemotherapy and, ultimately, bone marrow transplantation. Examination by the referring neurologist is notable for normal strength and sensory findings in the hands and distal lower limbs. 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    Techniques:  Motor conduction studies were done with surface recording electrodes. Sensory conduction studies were performed with surface electrodes, unless indicated otherwise by (n), designating the use of subdermal recording electrodes. Temperature was monitored and recorded throughout the study. Upper extremities were maintained at a temperature of 32 degrees Centigrade or higher.  Lower extremities were maintained at a temperature of 31 degrees Centigrade or higher.  EMG was performed with a concentric needle electrode.    Results:  Right sural and superficial peroneal sensory conduction studies were normal. Left radial, left median, and bilateral ulnar sensory conduction studies were normal. A right median sensory conduction study demonstrated mild slowing of conduction. Median palmar mixed nerve conduction studies demonstrated mild to moderate conduction slowing bilaterally. Peroneal motor conduction studies demonstrated moderate and severe attenuation of amplitude on the right and left, respectively, minimal prolongation of right peroneal distal latency, and normal conduction across the fibular head bilaterally. A right tibial motor  conduction study was normal. Bilateral ulnar motor conduction studies were normal. Median motor conduction studies demonstrated mild relative prolongation of distal latencies to the abductor pollicis brevis and second lumbrical muscles bilaterally. Electromyogaphy of the right lower limb was normal.     Interpretation:  This is an abnormal study, demonstrating electrophysiologic evidence of the followin. 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  2. Attenuation of peroneal compound muscle action potentials in the feet, possibly reflecting a mild motor-predominant neuropathy or incidental injury at the ankle.   3. No evidence of axonal injury to right lumbosacral nerve roots.      Carlito Ordonez MD      Sensory NCS      Nerve / Sites Rec. Site Onset Peak Ref. NP Amp Ref. PP Amp Dist Jose Ref. Temp     ms ms ms  V  V  V cm m/s m/s  C   L MEDIAN - Dig II Anti      Wrist Dig II 2.81 3.59  15.5 10.0 22.8 14 49.8 48.0 34.3   R MEDIAN - Dig II Anti      Wrist Dig II 3.13 4.01  7.2 10.0 6.3 14 44.8 48.0 33.4   L ULNAR - Dig V Anti      Wrist Dig V 2.50 3.18  21.8 8.0 31.3 12.5 50.0 48.0 34.5   R ULNAR - Dig V Anti      Wrist Dig V 2.55 3.23  16.4 8.0 24.1 12.5 49.0 48.0 33.4   L RADIAL - Snuff      Forearm Snuff 1.98 2.60  15.5 15.0 31.2 10.5 53.1 48.0 34.1   R SURAL - Lat Mall      Calf Ankle 3.28 4.06  13.8 8.0 15.5 14 42.7 38.0 31.7   R SUP PERONEAL      Lat Leg Flores 2.66 3.54  5.5  4.8 12.5 47.1 38.0 32.1   L MEDIAN - Ulnar - Palmar      Median Wrist 1.98 2.40 2.40 7.2  20.3 8 40.4  34.9      Ulnar Wrist 1.61 2.03 2.40 4.7  4.4 8 49.5  34.9   R MEDIAN - Ulnar - Palmar      Median Wrist 2.03 2.55 2.40 14.7  14.5 8 39.4  33.4      Ulnar Wrist 1.51 1.98 2.40 15.1  18.5 8 53.0  33.4       Motor NCS      Nerve / Sites Rec. Site Lat Ref. Amp Ref. Rel Amp Dist Jose Ref. Dur. Area Temp.     ms ms mV mV % cm m/s m/s ms %  C   L MEDIAN - APB      Wrist APB 4.27 4.40 11.2 5.0 100 8    8.18 100 33.8      Elbow APB 8.80  9.3  82.8 24 53.0 48.0 8.33 86.2 33.6   R MEDIAN - APB      Wrist APB 4.32 4.40 11.9 5.0 100 8   8.18 100 33.5      Elbow APB 9.11  11.9  99.8 26 54.3 48.0 8.12 103 33.5   L ULNAR - ADM      Wrist ADM 2.66 3.50 15.8 5.0 100 8   6.20 100 32.1      B.Elbow ADM 6.72  15.9  100 26 64.0 48.0 6.30 100 32.6      A.Elbow ADM 8.13  15.8  99.8 9 64.0 48.0 6.35 98.6 32.9   R ULNAR - ADM      Wrist ADM 2.50 3.50 14.3 5.0 100 8   6.61 100 33.8      B.Elbow ADM 6.77  13.9  97.3 24 56.2 48.0 6.88 96.7 33.8      A.Elbow ADM 8.65  13.5  94.2 10 53.3 48.0 6.67 94 33.6   R DEEP PERONEAL - EDB 60      Ankle EDB 6.46 6.00 1.1 2.0 100 8   7.97 100 32      FibHead EDB 15.83  0.9  79.9 40 42.7 38.0 11.61 89.2 32      Pop Fos EDB 17.34  0.9  81.8 8 53.0 38.0 12.14  32   L DEEP PERONEAL - EDB 60      Ankle EDB 5.57 6.00 0.2 2.0 100 8   10.68 100 31   R TIBIAL - AH      Ankle AH 4.58 6.00 4.5 4.0 100 8   9.01 100 31.7      Pop Fos AH 14.11  4.1  90.9 40 42.0 38.0 10.57 83.3 31.5   L MEDIAN - II Lumb      Median II Lumb 3.28  2.7  100 10   5.57 100 33.9      Ulnar Palm Int 2.76  4.2  154 10   6.04 131 33.8   R MEDIAN - II Lumb      Median II Lumb 4.22  1.5  100 10   5.78 100 33.6      Ulnar Palm Int 3.18  1.4  95.4 10   6.09 109 33.6   R PERONEAL - Tib Ant      Fib Head Tib Ant 3.65  6.8  100 12   11.30 100 31.9      Knee Tib Ant 4.95  6.6  97.2 8 61.4  11.41 96.4 31.9   L PERONEAL - Tib Ant      Fib Head Tib Ant 3.28  5.2  100 12   9.11 100 31      Knee Tib Ant 4.74  5.3  101 8 54.9  8.80 100 31       F  Wave      Nerve Min F Lat Max F Lat Mean FLat Temp.    ms ms ms  C   R TIBIAL 52.03 55.47 54.06 31.2       EMG Summary Table     Spontaneous MUAP Recruitment    IA Fib/PSW Fasc H.F. Amp Dur. PPP Pattern   R. TIB ANTERIOR N None None None N N None Normal   R. GASTROCN (MED) N None None None N N None Normal   R. PERON TERTIUS N None None None N N None Normal   R. VAST LATERALIS N None None None N N None Normal    R. BIC FEM (S HEAD) N None None None N N None Normal   R. GLUTEUS MED N None None None N N None Normal   R. L5 PSP N None None None N N None Normal

## 2021-04-21 DIAGNOSIS — G56.03 BILATERAL CARPAL TUNNEL SYNDROME: Primary | ICD-10-CM

## 2021-05-17 ENCOUNTER — TELEPHONE (OUTPATIENT)
Dept: NEUROLOGY | Facility: CLINIC | Age: 36
End: 2021-05-17

## 2021-05-17 NOTE — TELEPHONE ENCOUNTER
Pt does not remember any information regarding MRI and EMG results that was discussed with Dr. Morocho. Per wife, pt has short-term memory loss according to neuropsych testing done at [facility] on 05/13/21. Wife informed that pt was given a referral to [facility] for changes seen of bilateral CTS. However, they have not received a call to schedule this appt. Pt given number to call. Wife would like to further discuss MRI and EMG results with provider if possible via phone. Pt open to receiving a call from provider or else schedule a telephone visit to discuss. Message routed to provider to advise.      Tracie De Jesus, RNCC  Neurology     Discussed EMG results with patient's wife. Bg Morocho MD

## 2021-05-17 NOTE — TELEPHONE ENCOUNTER
[facility] Call Center    Phone Message    May a detailed message be left on voicemail: yes     Reason for Call: Requesting Results   Name/type of test: EMG  Date of test: 04/19/21  Was test done at a location other than [facility] (Please fill in the location if not [facility])?: No      Action Taken: Message routed to:  Adult Clinics: Neurology p [id number]    Travel Screening: Not Applicable